# Patient Record
Sex: FEMALE | Race: WHITE | Employment: FULL TIME | URBAN - METROPOLITAN AREA
[De-identification: names, ages, dates, MRNs, and addresses within clinical notes are randomized per-mention and may not be internally consistent; named-entity substitution may affect disease eponyms.]

---

## 2024-09-17 ENCOUNTER — HOSPITAL ENCOUNTER (EMERGENCY)
Facility: HOSPITAL | Age: 39
Discharge: HOME | End: 2024-09-17
Attending: EMERGENCY MEDICINE
Payer: COMMERCIAL

## 2024-09-17 VITALS
TEMPERATURE: 98 F | HEART RATE: 85 BPM | HEIGHT: 60 IN | OXYGEN SATURATION: 97 % | BODY MASS INDEX: 35.34 KG/M2 | RESPIRATION RATE: 14 BRPM | DIASTOLIC BLOOD PRESSURE: 73 MMHG | WEIGHT: 180 LBS | SYSTOLIC BLOOD PRESSURE: 112 MMHG

## 2024-09-17 DIAGNOSIS — N76.4 LABIAL ABSCESS: Primary | ICD-10-CM

## 2024-09-17 PROCEDURE — 96361 HYDRATE IV INFUSION ADD-ON: CPT

## 2024-09-17 PROCEDURE — 99284 EMERGENCY DEPT VISIT MOD MDM: CPT

## 2024-09-17 PROCEDURE — 96374 THER/PROPH/DIAG INJ IV PUSH: CPT

## 2024-09-17 PROCEDURE — 56405 I&D VULVA/PERINEAL ABSCESS: CPT

## 2024-09-17 PROCEDURE — 96375 TX/PRO/DX INJ NEW DRUG ADDON: CPT

## 2024-09-17 RX ORDER — CLINDAMYCIN HCL 300 MG
300 CAPSULE ORAL 3 TIMES DAILY
Qty: 21 CAPSULE | Refills: 0 | Status: SHIPPED | OUTPATIENT
Start: 2024-09-17 | End: 2024-09-24

## 2024-09-17 RX ORDER — ONDANSETRON 2 MG/ML
4 INJECTION INTRAMUSCULAR; INTRAVENOUS ONCE
Status: COMPLETED | OUTPATIENT
Start: 2024-09-17 | End: 2024-09-17

## 2024-09-17 RX ORDER — HYDROMORPHONE HYDROCHLORIDE 1 MG/ML
1 INJECTION, SOLUTION INTRAMUSCULAR; INTRAVENOUS; SUBCUTANEOUS ONCE
Status: COMPLETED | OUTPATIENT
Start: 2024-09-17 | End: 2024-09-17

## 2024-09-17 RX ORDER — HYDROCODONE BITARTRATE AND ACETAMINOPHEN 5; 325 MG/1; MG/1
1-2 TABLET ORAL EVERY 6 HOURS PRN
Qty: 10 TABLET | Refills: 0 | Status: SHIPPED | OUTPATIENT
Start: 2024-09-17 | End: 2024-09-22

## 2024-09-17 NOTE — ED QUICK NOTES
RN Station report received from DAVIDSON Trammell. Patient is nitrous and procedure w/MD at this time. Plan of care reviewed.

## 2024-09-17 NOTE — ED QUICK NOTES
Pt up and ambulating; denies dizziness, HA, N/V; states she \"feels good other then the pain from procedure\"; teaching per MD instruction given to pt and family; pt and family verbalized understanding

## 2024-09-17 NOTE — ED INITIAL ASSESSMENT (HPI)
Patient reports she went to immediate care for an infected hair follicle yesterday and was prescribed abx. States the size has increased and the pain is severe. No fevers

## 2024-09-17 NOTE — ED PROVIDER NOTES
Patient Seen in: [facility] Emergency Department      History     Chief Complaint   Patient presents with    Eval-G     \"In grown hair follicle\"      Stated Complaint:     Subjective:   HPI    Patient is a 38-year-old woman presents with swelling from her left vulva.  Says it started out as an infected hair.  Was put on oral antibiotics.  Its gotten much larger and painful since that time.  Here with significant other.  No history of similar symptoms.  No fevers or chills.  Denies being pregnant.  No other specific complaints.    Objective:   History reviewed. No pertinent past medical history.           History reviewed. No pertinent surgical history.             Social History     Socioeconomic History    Marital status:    Tobacco Use    Smoking status: Every Day     Types: Cigarettes    Smokeless tobacco: Never   Substance and Sexual Activity    Alcohol use: Never    Drug use: Never              Review of Systems    Positive for stated Chief Complaint: Eval-G (\"In grown hair follicle\" )    Other systems are as noted in HPI.  Constitutional and vital signs reviewed.      All other systems reviewed and negative except as noted above.    Physical Exam     ED Triage Vitals   BP 09/17/24 0702 (!) 134/94   Pulse 09/17/24 0702 107   Resp 09/17/24 0702 22   Temp 09/17/24 0702 97.3 °F (36.3 °C)   Temp src 09/17/24 0959 Temporal   SpO2 09/17/24 0702 96 %   O2 Device 09/17/24 0702 None (Room air)       Current Vitals:   Vital Signs  BP: 143/86  Pulse: 100  Resp: 11  Temp: 97.9 °F (36.6 °C)  Temp src: Temporal  MAP (mmHg): (!) 103    Oxygen Therapy  SpO2: 98 %  O2 Device: Non-rebreather mask  O2 Flow Rate (L/min): 16 L/min  Pulse Oximetry Type: Continuous  Pulse Ox Probe Location: Left hand            Physical Exam    General: Well-appearing patient of stated age resting comfortably  HEENT: Normocephalic atraumatic.  Nonicteric sclera.  Moist mucous membranes  Lungs: No tachypnea  Cardiac: No tachycardia    exam: Performed with nurse chaperone: There is a large area of focal swelling in the left vulva.  Some purulent drainage.  Skin: No rashes, pallor  Neuro: No focal deficits.  Extremities:    ED Course   Labs Reviewed - No data to display                   MDM      Patient presents with a left vulvar abscess.  Discussed options with patient.  Suggested I&D.  Patient consents.  Was benefits alternatives discussed.  Patient was treated with IV Dilaudid.  Nitrous oxide was given per protocol.  1% lidocaine was instilled over the area of maximal fluctuance.  11 blade was used to make an incision.  Significant purulent material was expressed.  It was irrigated.  Patient is allergic to Ceclor.  Will treat with clindamycin for 7 days.  Short supply of Norco for pain.  Should follow-up with OB/GYN.  Suggest sitz bath's 2-3 times a day.  Return if recurrent symptoms or new complaints.                                   Medical Decision Making      Disposition and Plan     Clinical Impression:  1. Labial abscess         Disposition:  Discharge  9/17/2024 10:11 am    Follow-up:  Yina Mast MD  [address] [place] [id number] [phone]    Follow up in 1 week(s)            Medications Prescribed:  Current Discharge Medication List        START taking these medications    Details   clindamycin 300 MG Oral Cap Take 1 capsule (300 mg total) by mouth 3 (three) times daily for 7 days.  Qty: 21 capsule, Refills: 0    Associated Diagnoses: Labial abscess      HYDROcodone-acetaminophen 5-325 MG Oral Tab Take 1-2 tablets by mouth every 6 (six) hours as needed for Pain.  Qty: 10 tablet, Refills: 0    Associated Diagnoses: Labial abscess

## 2024-09-17 NOTE — DISCHARGE INSTRUCTIONS
Labial abscess.  Sitz bath's 2-3 times a day.  Wash with soap and water.  Take 1-2 Norco every 6-8 hours.  Will treat with clindamycin 1 tablet 3 times a day for 7 days.  Follow-up with OB/GYN.  Return if increased swelling, new complaints.

## 2024-09-17 NOTE — ED QUICK NOTES
100% APPLIED USING NON-REBREATHER PER POTOCOL; PT VS STABLE WINL; O2 REMOVED; PT REPORTS NO DISCOMFORT N/V, HA, DIZZINESS